# Patient Record
Sex: FEMALE | Race: OTHER | NOT HISPANIC OR LATINO
[De-identification: names, ages, dates, MRNs, and addresses within clinical notes are randomized per-mention and may not be internally consistent; named-entity substitution may affect disease eponyms.]

---

## 2020-06-30 ENCOUNTER — TRANSCRIPTION ENCOUNTER (OUTPATIENT)
Age: 40
End: 2020-06-30

## 2020-08-06 PROBLEM — Z00.00 ENCOUNTER FOR PREVENTIVE HEALTH EXAMINATION: Status: ACTIVE | Noted: 2020-08-06

## 2020-08-07 ENCOUNTER — APPOINTMENT (OUTPATIENT)
Dept: NEUROLOGY | Facility: CLINIC | Age: 40
End: 2020-08-07
Payer: COMMERCIAL

## 2020-08-07 PROCEDURE — 99203 OFFICE O/P NEW LOW 30 MIN: CPT | Mod: 95

## 2020-08-17 RX ORDER — ACETAMINOPHEN 325 MG/1
TABLET, FILM COATED ORAL
Refills: 0 | Status: ACTIVE | COMMUNITY

## 2020-08-17 RX ORDER — IBUPROFEN 200 MG
TABLET ORAL
Refills: 0 | Status: ACTIVE | COMMUNITY

## 2020-08-17 RX ORDER — TIZANIDINE 2 MG/1
2 TABLET ORAL
Refills: 0 | Status: ACTIVE | COMMUNITY

## 2020-08-17 NOTE — PLAN
[FreeTextEntry1] : 40 year old woman is being seen via telehealth visit for back pain after her accident two months ago. She fell down the stairs and has sharp shooting pain in her lower back to her right leg. She is not able to tolerate PT as she reports whole body shaking when the therapist lift her right leg. Her symptoms are suggestive of acute lumbar radiculopathy. I will start her on prednisone taper and will order lumbar spine MRI and EMG for further evaluation \par

## 2020-08-17 NOTE — HISTORY OF PRESENT ILLNESS
[Other Location: e.g. Home (Enter Location, City,State)___] : at [unfilled] [Home] : at home, [unfilled] , at the time of the visit. [Verbal consent obtained from patient] : the patient, [unfilled] [Time Spent: ___ minutes] : I have spent [unfilled] minutes with the patient on the telephone [FreeTextEntry1] : MISHA LOGAN is a 40 year old woman is being seen via telehealth visit for back pain. Remote direct face to face connection was obtained via [employer]. \par She fell down from 5 stairs last June. Since then she started to have intermittent low back pain in the  right side. After the accident she was seen in urgent care and was prescribed muscle relaxant with no significant effect. She reports bruises and swelling in the waste. She started to work with physical therapist but could not tolerate the cessions as she he reported each time therapist tried to lift her right leg she started to have shaking all over her body due to pain. She had shaking in in her trunk, right hand and left arm. She denies anxiety and depression. She currently takes Acetaminophen, ibuprofen and tizanidine with no significant effect.  She now has constant sharp shooting pain in her lower back with radiation and paresthesia to the right foot. She is only able to walk 50 feet and then her balance becomes unsteady.

## 2020-08-21 ENCOUNTER — APPOINTMENT (OUTPATIENT)
Dept: OBGYN | Facility: CLINIC | Age: 40
End: 2020-08-21
Payer: COMMERCIAL

## 2020-08-21 VITALS
HEIGHT: 66 IN | BODY MASS INDEX: 19.93 KG/M2 | WEIGHT: 124 LBS | DIASTOLIC BLOOD PRESSURE: 78 MMHG | HEART RATE: 68 BPM | SYSTOLIC BLOOD PRESSURE: 123 MMHG | TEMPERATURE: 97.8 F

## 2020-08-21 DIAGNOSIS — Z78.9 OTHER SPECIFIED HEALTH STATUS: ICD-10-CM

## 2020-08-21 DIAGNOSIS — Z86.32 PERSONAL HISTORY OF GESTATIONAL DIABETES: ICD-10-CM

## 2020-08-21 DIAGNOSIS — Z83.3 FAMILY HISTORY OF DIABETES MELLITUS: ICD-10-CM

## 2020-08-21 PROCEDURE — 99386 PREV VISIT NEW AGE 40-64: CPT

## 2020-08-25 PROBLEM — Z78.9 EXERCISES OCCASIONALLY: Status: ACTIVE | Noted: 2020-08-21

## 2020-08-25 PROBLEM — Z86.32 HISTORY OF GESTATIONAL DIABETES MELLITUS (GDM): Status: RESOLVED | Noted: 2020-08-25 | Resolved: 2020-08-25

## 2020-08-25 PROBLEM — Z78.9 CONSUMES ALCOHOL OCCASIONALLY: Status: ACTIVE | Noted: 2020-08-21

## 2020-08-25 PROBLEM — Z83.3 FAMILY HISTORY OF DIABETES MELLITUS: Status: ACTIVE | Noted: 2020-08-21

## 2020-08-25 LAB
C TRACH RRNA SPEC QL NAA+PROBE: NOT DETECTED
N GONORRHOEA RRNA SPEC QL NAA+PROBE: NOT DETECTED
SOURCE AMPLIFICATION: NORMAL

## 2020-08-25 NOTE — PHYSICAL EXAM
[Awake] : awake [Alert] : alert [Acute Distress] : no acute distress [LAD] : no lymphadenopathy [Mass] : no breast mass [Nipple Discharge] : no nipple discharge [Axillary LAD] : no axillary lymphadenopathy [Soft] : soft [Tender] : non tender [Distended] : not distended [Oriented x3] : oriented to person, place, and time [Depressed Mood] : not depressed [No Lesions] : no genitalia lesions [Normal] : external genitalia [Labia Majora] : labia major [Labia Minora] : labia minora [Pink Rugae] : pink rugae [No Bleeding] : there was no active vaginal bleeding [Discharge] : had no discharge [Pap Obtained] : a Pap smear was performed [Motion Tenderness] : there was no cervical motion tenderness [Tenderness] : nontender [Enlarged ___ wks] : not enlarged [Mass ___ cm] : no uterine mass was palpated [Uterine Adnexae] : were not tender and not enlarged

## 2020-08-25 NOTE — HISTORY OF PRESENT ILLNESS
[Definite:  ___ (Date)] : the last menstrual period was [unfilled] [Menarche Age: ____] : age at menarche was [unfilled] [Tubal Ligation] : has had a tubal ligation [Sexually Active] : is sexually active [Male ___] : [unfilled] male [No] : no [Regular Cycle Intervals] : periods have been regular [Monogamous] : is monogamous [Contraception] : does not use contraception

## 2020-08-25 NOTE — CHIEF COMPLAINT
[Initial Visit] : initial GYN visit [FreeTextEntry1] : Pt is here for initial annual exam. Overall pt feels good, denies any pelvic pain or abnormal bleeding. Pt states her menstrual cycle is regulated monthly with a normal flow, pt does notice her flow getting lighter. \par \par Mammo- 2020 WNL

## 2020-08-26 LAB
HPV HIGH+LOW RISK DNA PNL CVX: NOT DETECTED
SOURCE AMPLIFICATION: NORMAL
T VAGINALIS RRNA SPEC QL NAA+PROBE: NOT DETECTED

## 2020-10-27 ENCOUNTER — APPOINTMENT (OUTPATIENT)
Dept: NEUROLOGY | Facility: CLINIC | Age: 40
End: 2020-10-27
Payer: COMMERCIAL

## 2020-10-27 VITALS
HEART RATE: 98 BPM | BODY MASS INDEX: 19.93 KG/M2 | DIASTOLIC BLOOD PRESSURE: 73 MMHG | HEIGHT: 66 IN | WEIGHT: 124 LBS | SYSTOLIC BLOOD PRESSURE: 110 MMHG | TEMPERATURE: 97.8 F | OXYGEN SATURATION: 98 %

## 2020-10-27 DIAGNOSIS — M99.73 CONNECTIVE TISSUE AND DISC STENOSIS OF INTERVERTEBRAL FORAMINA OF LUMBAR REGION: ICD-10-CM

## 2020-10-27 DIAGNOSIS — M48.061 SPINAL STENOSIS, LUMBAR REGION WITHOUT NEUROGENIC CLAUDICATION: ICD-10-CM

## 2020-10-27 PROCEDURE — 99214 OFFICE O/P EST MOD 30 MIN: CPT

## 2020-10-27 PROCEDURE — 99072 ADDL SUPL MATRL&STAF TM PHE: CPT

## 2020-10-27 RX ORDER — DICLOFENAC POTASSIUM 50 MG/1
50 TABLET, COATED ORAL
Qty: 90 | Refills: 0 | Status: DISCONTINUED | COMMUNITY
Start: 2020-08-07 | End: 2020-10-27

## 2020-10-27 RX ORDER — TIZANIDINE 2 MG/1
2 TABLET ORAL DAILY
Qty: 30 | Refills: 3 | Status: ACTIVE | COMMUNITY
Start: 2020-10-27 | End: 1900-01-01

## 2020-10-27 RX ORDER — PREDNISONE 20 MG/1
20 TABLET ORAL
Qty: 16 | Refills: 0 | Status: DISCONTINUED | COMMUNITY
Start: 2020-08-07 | End: 2020-10-27

## 2020-10-27 NOTE — PHYSICAL EXAM
[FreeTextEntry1] : Mental status: Awake, alert and oriented x3.  Recent and remote memory intact.  Naming, repetition and comprehension intact.  Attention/concentration intact.  No dysarthria, no aphasia.  Fund of knowledge appropriate.  \par Cranial nerves: Pupils equally round and reactive to light, visual fields full, no nystagmus, extraocular muscles intact, V1 through V3 intact bilaterally and symmetric, face symmetric, hearing intact to finger rub, palate elevation symmetric, tongue was midline. \par Motor:  MRC grading 5/5 b/l UE/LE.   strength 5/5.  Normal tone and bulk.  No abnormal movements.  Focal tenderness on lumbar paraspinal muscles. Negative SLR. \par Sensation: Intact to light touch, proprioception, and pinprick. \par Coordination: No dysmetria on finger-to-nose and heel-to-shin.  No dysdiadokinesia.\par Reflexes: 2+ in bilateral UE/LE, downgoing toes bilaterally. (-) Ferreira.\par Gait: Narrow and steady. No ataxia.  Romberg negative\par \par

## 2020-10-27 NOTE — ASSESSMENT
[FreeTextEntry1] : MISHA LOGAN is a 40 year old woman is being seen as a follow up visit for low back pain with no significant sciatica for mild  lumbar spine stenosis and radiculopathy. She is now following with Dr Gaines and underwent epidural injections and nerve block with mild improvement. Currently reports low back stiffness and pain after long time sitting or walking. Recommended to do stretch exercises at home and resume light PT. I will obtain EMG/NCV of both legs. \par \par - Follow up with Dr Gaines for further procedures. \par - Resume PT \par - Home stretches \par - Continue tizanidine PRN\par - EMG of both legs .

## 2020-10-27 NOTE — HISTORY OF PRESENT ILLNESS
[FreeTextEntry1] : MISHA LOGAN is a 40 year old woman is being seen as a follow up patient for low back pain. She was seen on August 7th as a new patient via Telehealth visit after she fell down from 5 stairs last June and  started to have intermittent low back pain in the right side. She started to work with physical therapist but could not tolerate the cessions as she he reported each time therapist tried to lift her right leg she started to have shaking all over her body due to pain. She currently takes Acetaminophen, ibuprofen and tizanidine as needed. MRI lumbar spine showed multiple mild disc bulging at C3, C4 and C5 with bilateral foraminal stenosis. She was seen by Neurosurgeon Dr Gaines and underwent epidural injection, trigger point injection and SI joint injection. Her radiating pain is better but she still has stiffness in her lower back. At one point I prescribed her prednisone taper orally but she reports no relief. \par

## 2020-10-27 NOTE — REVIEW OF SYSTEMS
[Tingling] : tingling [Arthralgias] : no arthralgias [Joint Pain] : joint pain [Limb Pain] : limb pain [Negative] : Integumentary

## 2021-01-14 ENCOUNTER — APPOINTMENT (OUTPATIENT)
Dept: NEUROLOGY | Facility: CLINIC | Age: 41
End: 2021-01-14

## 2021-02-08 ENCOUNTER — LABORATORY RESULT (OUTPATIENT)
Age: 41
End: 2021-02-08

## 2021-02-08 ENCOUNTER — OUTPATIENT (OUTPATIENT)
Dept: OUTPATIENT SERVICES | Facility: HOSPITAL | Age: 41
LOS: 1 days | Discharge: HOME | End: 2021-02-08

## 2021-02-08 DIAGNOSIS — Z11.59 ENCOUNTER FOR SCREENING FOR OTHER VIRAL DISEASES: ICD-10-CM

## 2021-02-11 ENCOUNTER — APPOINTMENT (OUTPATIENT)
Dept: NEUROLOGY | Facility: CLINIC | Age: 41
End: 2021-02-11
Payer: COMMERCIAL

## 2021-02-11 PROCEDURE — 95886 MUSC TEST DONE W/N TEST COMP: CPT

## 2021-02-11 PROCEDURE — 99072 ADDL SUPL MATRL&STAF TM PHE: CPT

## 2021-02-11 PROCEDURE — 95911 NRV CNDJ TEST 9-10 STUDIES: CPT

## 2021-02-24 ENCOUNTER — APPOINTMENT (OUTPATIENT)
Dept: NEUROSURGERY | Facility: CLINIC | Age: 41
End: 2021-02-24
Payer: COMMERCIAL

## 2021-02-24 DIAGNOSIS — M79.10 MYALGIA, UNSPECIFIED SITE: ICD-10-CM

## 2021-02-24 DIAGNOSIS — M54.5 LOW BACK PAIN: ICD-10-CM

## 2021-02-24 PROCEDURE — 99072 ADDL SUPL MATRL&STAF TM PHE: CPT

## 2021-02-24 PROCEDURE — 99204 OFFICE O/P NEW MOD 45 MIN: CPT

## 2021-03-05 NOTE — HISTORY OF PRESENT ILLNESS
[de-identified] : MISHA LOGAN is a 40 year old woman is being seen for back pain.  She fell down from 5 stairs last June. Since then she started to have intermittent low back pain in the right side. After the accident she was seen in urgent care and was prescribed muscle relaxant with no significant effect. She had some radiation into the right foot with paraesthesias that have dissipated after injections and PT. she cont to have back pain. MRI of the lumbar spine is significant for minimal disc herniation at L5-S1. good alignment, no significant central or foraminal stenosis. EMG with no abnormalities.

## 2021-03-05 NOTE — PLAN
[FreeTextEntry1] : I do not believe Ms. Medrano would benefit from any structural spine surgery. She is relieved about that. We discussed extensively about the myriad causes of low back pain. I do believe she would benefit from extensive focused PT/daily stretching. She is not interested in any further injection therapies. she would like to go to [facility] to participate in an ayurvedic and yoga health retreat. I believe this will benefit her greatly. PT referral given. She would like to explore acupuncture, referral given. i will see her back as needed.

## 2021-11-15 ENCOUNTER — APPOINTMENT (OUTPATIENT)
Dept: OBGYN | Facility: CLINIC | Age: 41
End: 2021-11-15
Payer: COMMERCIAL

## 2021-11-15 VITALS
DIASTOLIC BLOOD PRESSURE: 74 MMHG | BODY MASS INDEX: 20.89 KG/M2 | TEMPERATURE: 97.7 F | HEART RATE: 82 BPM | HEIGHT: 66 IN | SYSTOLIC BLOOD PRESSURE: 115 MMHG | WEIGHT: 130 LBS

## 2021-11-15 DIAGNOSIS — Z87.39 PERSONAL HISTORY OF OTHER DISEASES OF THE MUSCULOSKELETAL SYSTEM AND CONNECTIVE TISSUE: ICD-10-CM

## 2021-11-15 PROCEDURE — 99396 PREV VISIT EST AGE 40-64: CPT

## 2021-11-15 PROCEDURE — XXXXX: CPT

## 2021-11-17 LAB
C TRACH RRNA SPEC QL NAA+PROBE: NOT DETECTED
HPV HIGH+LOW RISK DNA PNL CVX: NOT DETECTED
N GONORRHOEA RRNA SPEC QL NAA+PROBE: NOT DETECTED
SOURCE AMPLIFICATION: NORMAL
SOURCE AMPLIFICATION: NORMAL
T VAGINALIS RRNA SPEC QL NAA+PROBE: NOT DETECTED

## 2021-11-23 ENCOUNTER — NON-APPOINTMENT (OUTPATIENT)
Age: 41
End: 2021-11-23

## 2021-12-03 LAB — CYTOLOGY CVX/VAG DOC THIN PREP: NORMAL

## 2022-05-19 PROBLEM — Z87.39 HISTORY OF BACK PAIN: Status: RESOLVED | Noted: 2022-05-19 | Resolved: 2022-05-19

## 2022-05-19 NOTE — DISCUSSION/SUMMARY
[FreeTextEntry1] : A: 41-year-old for annual GYN exam, intermittent breast pain\par \par P: GYN exam done\par Pap smear done\par Safe sex practices discussed\par Pain and bleeding precautions\par Referral for diagnostic mammo and breast ultrasound\par Encourage healthy diet and exercise\par Follow-up for routine exam or as needed

## 2022-05-19 NOTE — PHYSICAL EXAM
[Appropriately responsive] : appropriately responsive [Alert] : alert [No Acute Distress] : no acute distress [No Lymphadenopathy] : no lymphadenopathy [Regular Rate Rhythm] : regular rate rhythm [Soft] : soft [Non-tender] : non-tender [Non-distended] : non-distended [No Mass] : no mass [Oriented x3] : oriented x3 [FreeTextEntry6] : Some mild generalized tenderness bilaterally however no mass or pinpoint pain noted, no discharge or skin changes [Examination Of The Breasts] : a normal appearance [No Discharge] : no discharge [No Masses] : no breast masses were palpable [No Lesions] : no lesions  [Labia Majora] : normal [Labia Minora] : normal [Normal] : normal [No Bleeding] : There was no active vaginal bleeding [Tenderness] : nontender [Enlarged ___ wks] : not enlarged [Mass ___ cm] : no uterine mass was palpated [Uterine Adnexae] : normal [FreeTextEntry5] : Pap smear done

## 2022-05-19 NOTE — HISTORY OF PRESENT ILLNESS
[Patient reported mammogram was normal] : Patient reported mammogram was normal [Patient reported PAP Smear was normal] : Patient reported PAP Smear was normal [Patient reported colonoscopy was normal] : Patient reported colonoscopy was normal [N] : Patient reports normal menses [Normal Amount/Duration] :  normal amount and duration [Regular Cycle Intervals] : periods have been regular [Frequency: Q ___ days] : menstrual periods occur approximately every [unfilled] days [Menarche Age: ____] : age at menarche was [unfilled] [Currently Active] : currently active [Men] : men [No] : No [Gonorrhea test offered] : Gonorrhea test offered [Chlamydia test offered] : Chlamydia test offered [Trichomonas test offered] : Trichomonas test offered [HPV test offered] : HPV test offered [Y] : Patient uses contraception [Tubal Occlusion] : has had a tubal occlusion [TextBox_4] : 41-year-old para 0-2-1-2 with LMP 11/1/2021 came for annual GYN exam and Pap smear.  Patient denies abnormal uterine bleeding, pelvic pain, discharge, dysuria or other GYN complaints.  She does complain of some intermittent breast pain, she denies abnormal masses or discharge or trauma.  She denies family history of breast cancer.  We will send for diagnostic imaging.  She denies history of abnormal Pap, HPV, STDs, fibroids or cysts.  She is sexually active with 1 male partner- and had bilateral tubal ligation as form of contraception. [Mammogramdate] : 2020 [TextBox_19] : Will give referral [PapSmeardate] : 8/2020 [ColonoscopyDate] : 2006 [TextBox_43] : Recommended screening at 45 years of age [LMPDate] : 11/1/21 [MensesFreq] : 24 [MensesLength] : 5 [MensesAmount] : Nl flow  [PGHxTotal] : 3 [PGHxFullTerm] : 0 [PGxPremature] : 2 [PGHxAbortions] : 1 [[place]xLiving] : 2 [PGHxABInduced] : 0 [PGHxABSpont] : 0 [PGxEctopic] : 1 [PGHxMultBirths] : 0 [FreeTextEntry1] : 11/1/21 [FreeTextEntry3] : Tubal ligation

## 2022-11-17 ENCOUNTER — APPOINTMENT (OUTPATIENT)
Dept: PAIN MANAGEMENT | Facility: CLINIC | Age: 42
End: 2022-11-17

## 2022-12-08 ENCOUNTER — APPOINTMENT (OUTPATIENT)
Dept: NEUROLOGY | Facility: CLINIC | Age: 42
End: 2022-12-08

## 2023-01-12 ENCOUNTER — APPOINTMENT (OUTPATIENT)
Dept: NEUROLOGY | Facility: CLINIC | Age: 43
End: 2023-01-12
Payer: COMMERCIAL

## 2023-01-12 VITALS
HEART RATE: 77 BPM | WEIGHT: 132 LBS | DIASTOLIC BLOOD PRESSURE: 91 MMHG | SYSTOLIC BLOOD PRESSURE: 124 MMHG | HEIGHT: 66 IN | BODY MASS INDEX: 21.21 KG/M2

## 2023-01-12 DIAGNOSIS — M54.16 RADICULOPATHY, LUMBAR REGION: ICD-10-CM

## 2023-01-12 PROCEDURE — 99204 OFFICE O/P NEW MOD 45 MIN: CPT

## 2023-01-12 RX ORDER — TIZANIDINE 4 MG/1
4 TABLET ORAL
Qty: 60 | Refills: 1 | Status: ACTIVE | COMMUNITY
Start: 2023-01-12 | End: 1900-01-01

## 2023-01-12 RX ORDER — GABAPENTIN 300 MG/1
300 CAPSULE ORAL 3 TIMES DAILY
Qty: 90 | Refills: 2 | Status: ACTIVE | COMMUNITY
Start: 2023-01-12 | End: 1900-01-01

## 2023-01-17 NOTE — HISTORY OF PRESENT ILLNESS
[FreeTextEntry1] : Ms. Koenig is a 42 year old woman who comes in today for neck and back pain. It started October 2022after chiropractics manipulation of her neck. Treatment for the back did not cause any complications.  With month of physical therapy afterwards, she has not seen any significant improvement.  She is not able to drive. Tension does radiate from he neck to her shoulders.   She denies any significant weakness, numbness, urinary/ bowel incontinence

## 2023-01-17 NOTE — ASSESSMENT
[FreeTextEntry1] : Cervical radiculopathy\par \par -MRI of the cervical spine without mario alberto, EMG/NCS of upper extremities\par - will also send her for ultrasound of the carotids make sure no dissection since her symptoms started after chiropractic manipulation of the neck\par -a trial of gabapentin  and tizanidine, and I warned her of the potential side effects including drowsiness and mood changes, and patient reported understanding\par -follow up in 1 month.  if not better, will refer to pain management.\par \par \par \par Alexandrea SOTO attest that this document has been prepared under the direction and in the presence of Provider Dipti Duval \par Thank You for letting me assist in the management of this patient.\par \par Lopez Arora DO \pete Board Certified, Neurology

## 2023-01-17 NOTE — PHYSICAL EXAM
[Person] : oriented to person [Place] : oriented to place [Time] : oriented to time [Concentration Intact] : normal concentrating ability [Visual Intact] : visual attention was ~T not ~L decreased [Naming Objects] : no difficulty naming common objects [Repeating Phrases] : no difficulty repeating a phrase [Writing A Sentence] : no difficulty writing a sentence [Fluency] : fluency intact [Comprehension] : comprehension intact [Reading] : reading intact [Past History] : adequate knowledge of personal past history [Cranial Nerves Optic (II)] : visual acuity intact bilaterally,  visual fields full to confrontation, pupils equal round and reactive to light [Cranial Nerves Oculomotor (III)] : extraocular motion intact [Cranial Nerves Trigeminal (V)] : facial sensation intact symmetrically [Cranial Nerves Facial (VII)] : face symmetrical [Cranial Nerves Vestibulocochlear (VIII)] : hearing was intact bilaterally [Cranial Nerves Glossopharyngeal (IX)] : tongue and palate midline [Cranial Nerves Accessory (XI - Cranial And Spinal)] : head turning and shoulder shrug symmetric [Cranial Nerves Hypoglossal (XII)] : there was no tongue deviation with protrusion [Motor Tone] : muscle tone was normal in all four extremities [Motor Strength] : muscle strength was normal in all four extremities [No Muscle Atrophy] : normal bulk in all four extremities [Sensation Tactile Decrease] : light touch was intact [Abnormal Walk] : normal gait [Balance] : balance was intact [Past-pointing] : there was no past-pointing [Tremor] : no tremor present [2+] : Ankle jerk left 2+ [Plantar Reflex Right Only] : normal on the right [Plantar Reflex Left Only] : normal on the left [FreeTextEntry6] :  limited range of motion in the neck horizontally, spasms in the bilateral trapezii as well as cervical paraspinals with tender points

## 2023-02-13 ENCOUNTER — APPOINTMENT (OUTPATIENT)
Dept: NEUROLOGY | Facility: CLINIC | Age: 43
End: 2023-02-13
Payer: COMMERCIAL

## 2023-02-13 PROCEDURE — 95886 MUSC TEST DONE W/N TEST COMP: CPT

## 2023-02-13 PROCEDURE — 95911 NRV CNDJ TEST 9-10 STUDIES: CPT

## 2023-02-22 ENCOUNTER — APPOINTMENT (OUTPATIENT)
Dept: NEUROLOGY | Facility: CLINIC | Age: 43
End: 2023-02-22
Payer: COMMERCIAL

## 2023-02-22 VITALS
DIASTOLIC BLOOD PRESSURE: 85 MMHG | HEART RATE: 113 BPM | HEIGHT: 66 IN | WEIGHT: 132 LBS | SYSTOLIC BLOOD PRESSURE: 113 MMHG | BODY MASS INDEX: 21.21 KG/M2

## 2023-02-22 DIAGNOSIS — M54.12 RADICULOPATHY, CERVICAL REGION: ICD-10-CM

## 2023-02-22 PROCEDURE — 99213 OFFICE O/P EST LOW 20 MIN: CPT

## 2023-02-22 RX ORDER — PREGABALIN 75 MG/1
75 CAPSULE ORAL 3 TIMES DAILY
Qty: 90 | Refills: 3 | Status: ACTIVE | COMMUNITY
Start: 2023-02-22 | End: 1900-01-01

## 2023-02-22 NOTE — ASSESSMENT
[FreeTextEntry1] :  cervical radiculopathy\par - Physical Therapy. \par - A Trial of Pregabalin. 1 Tablet TID. \par \par \par I, Alexandrea Whipple, Attest that this documentation has been prepared under the direction and in the presence of Provider Lopez Arora DO\par \par Thank You for letting me assist in the management of this patient. \par \par Lopez Arora DO\par Board Certified, Neurology\par

## 2023-02-22 NOTE — HISTORY OF PRESENT ILLNESS
[FreeTextEntry1] : Ms. Koenig is a 42 year old woman who returns for a follow up in regards to her EMG results. She has a sharp shooting pain in the back of her head since she left the office last visit. Her pain is on her neck and radiates to her left ear and cheek. MRI imaging showed minor bulging discs. Carotid Ultrasound was normal.  she was given gabapentin did not receive it.  She saw a neurologist in [place] and was given a Lyrica equivalent which she did find helpful and would like to continue\par \par

## 2023-02-22 NOTE — PHYSICAL EXAM

## 2023-02-22 NOTE — REASON FOR VISIT
[Follow-Up: _____] : a [unfilled] follow-up visit [Consultation] : a consultation visit [FreeTextEntry1] : Neck Pain

## 2023-04-26 ENCOUNTER — APPOINTMENT (OUTPATIENT)
Dept: NEUROLOGY | Facility: CLINIC | Age: 43
End: 2023-04-26

## 2023-10-01 PROBLEM — M54.16 LUMBAR RADICULAR PAIN: Status: ACTIVE | Noted: 2020-08-07

## 2023-11-14 ENCOUNTER — APPOINTMENT (OUTPATIENT)
Dept: OBGYN | Facility: CLINIC | Age: 43
End: 2023-11-14
Payer: COMMERCIAL

## 2023-11-14 VITALS
TEMPERATURE: 98.6 F | WEIGHT: 133 LBS | HEART RATE: 86 BPM | DIASTOLIC BLOOD PRESSURE: 80 MMHG | HEIGHT: 66 IN | BODY MASS INDEX: 21.38 KG/M2 | SYSTOLIC BLOOD PRESSURE: 109 MMHG

## 2023-11-14 DIAGNOSIS — Z87.42 PERSONAL HISTORY OF OTHER DISEASES OF THE FEMALE GENITAL TRACT: ICD-10-CM

## 2023-11-14 DIAGNOSIS — Z12.4 ENCOUNTER FOR SCREENING FOR MALIGNANT NEOPLASM OF CERVIX: ICD-10-CM

## 2023-11-14 DIAGNOSIS — Z01.419 ENCOUNTER FOR GYNECOLOGICAL EXAMINATION (GENERAL) (ROUTINE) W/OUT ABNORMAL FINDINGS: ICD-10-CM

## 2023-11-14 PROCEDURE — 99396 PREV VISIT EST AGE 40-64: CPT

## 2023-11-24 ENCOUNTER — NON-APPOINTMENT (OUTPATIENT)
Age: 43
End: 2023-11-24

## 2023-11-25 LAB — CYTOLOGY CVX/VAG DOC THIN PREP: NORMAL

## 2023-12-01 ENCOUNTER — APPOINTMENT (OUTPATIENT)
Dept: OBGYN | Facility: CLINIC | Age: 43
End: 2023-12-01
Payer: COMMERCIAL

## 2023-12-01 VITALS
TEMPERATURE: 98.6 F | WEIGHT: 135 LBS | SYSTOLIC BLOOD PRESSURE: 103 MMHG | DIASTOLIC BLOOD PRESSURE: 79 MMHG | HEIGHT: 66 IN | HEART RATE: 70 BPM | BODY MASS INDEX: 21.69 KG/M2

## 2023-12-01 DIAGNOSIS — D21.9 BENIGN NEOPLASM OF CONNECTIVE AND OTHER SOFT TISSUE, UNSPECIFIED: ICD-10-CM

## 2023-12-01 DIAGNOSIS — R92.2 INCONCLUSIVE MAMMOGRAM: ICD-10-CM

## 2023-12-01 DIAGNOSIS — Z71.2 PERSON CONSULTING FOR EXPLANATION OF EXAMINATION OR TEST FINDINGS: ICD-10-CM

## 2023-12-01 DIAGNOSIS — N64.89 OTHER SPECIFIED DISORDERS OF BREAST: ICD-10-CM

## 2023-12-01 DIAGNOSIS — Z71.89 OTHER SPECIFIED COUNSELING: ICD-10-CM

## 2023-12-01 DIAGNOSIS — R92.30 INCONCLUSIVE MAMMOGRAM: ICD-10-CM

## 2023-12-01 DIAGNOSIS — N80.03 ADENOMYOSIS OF THE UTERUS: ICD-10-CM

## 2023-12-01 PROCEDURE — 99213 OFFICE O/P EST LOW 20 MIN: CPT

## 2023-12-07 PROBLEM — Z12.4 ENCOUNTER FOR SCREENING FOR CERVICAL CANCER: Status: ACTIVE | Noted: 2020-08-21

## 2023-12-07 PROBLEM — Z87.42 HISTORY OF BREAST PAIN: Status: RESOLVED | Noted: 2022-05-19 | Resolved: 2023-12-07

## 2023-12-23 PROBLEM — N80.03 ADENOMYOSIS: Status: ACTIVE | Noted: 2023-12-23

## 2023-12-23 PROBLEM — Z71.2 ENCOUNTER TO DISCUSS TEST RESULTS: Status: ACTIVE | Noted: 2023-12-23

## 2023-12-23 PROBLEM — D21.9 FIBROIDS: Status: ACTIVE | Noted: 2023-11-14

## 2023-12-23 PROBLEM — N64.89 BREAST ASYMMETRY: Status: ACTIVE | Noted: 2023-11-30

## 2023-12-23 PROBLEM — R92.2 DENSE BREASTS: Status: ACTIVE | Noted: 2023-11-14

## 2023-12-23 PROBLEM — Z71.89 OTHER SPECIFIED COUNSELING: Status: ACTIVE | Noted: 2020-08-25

## 2023-12-23 NOTE — DISCUSSION/SUMMARY
[FreeTextEntry1] : A:44yo with fibroids, adenomyosis, breast asymmetry, dense breasts  P: results reviewed and discussed     safe sex practices      pain and bleeding precautions      referral for further breast imaging given      will give referral to breast surgeon PRN       f/u for routine exam or PRN

## 2023-12-23 NOTE — COUNSELING
[Vitamins/Supplements] : vitamins/supplements [Nutrition/ Exercise/ Weight Management] : nutrition, exercise, weight management [Contraception/ Emergency Contraception/ Safe Sexual Practices] : contraception, emergency contraception, safe sexual practices [STD (testing, results, tx)] : STD (testing, results, tx) [Lab Results] : lab results [Medication Management] : medication management

## 2023-12-23 NOTE — HISTORY OF PRESENT ILLNESS
[Patient reported PAP Smear was normal] : Patient reported PAP Smear was normal [TextBox_4] : 42yo  with LMP 11/20/2023 came for consultation to discuss recent test results.  She went for recent poncho/sono for screening and was birads 0 needing further imaging and referral given.  She has upcoming appt.  She also had recent pelvic sonogram done just for evaluation of known fibroids.  She was concerned about findings and wanted to discuss.  Patient cousneled that sonogram showed uterus 9.8x8.0x8.8cm with anterior fundal fibroid measuring [id number].  The myometrium is diffusely heterogeneous and the EMS is 8mm.  Both ovaries appear WNL and trace free fluid noted. Patient cousneled on possibility of adenomyosis and fibroid mgmt.  She understood all counseling and all questions answered satisfactorily.  She states she feels well and has no complaints and does not want any intervention.  Strict pain and bleeding precautions and will monitor PRN.  Patient encourage to follow up for routine exam or PRN. [TextBox_19] : birads 0 [Mammogramdate] : 11/2023 [BreastSonogramDate] : 11/2023 [PapSmeardate] : 11/2023 [FreeTextEntry1] : 11/20/23 [FreeTextEntry3] : tubes tied

## 2024-01-03 ENCOUNTER — NON-APPOINTMENT (OUTPATIENT)
Age: 44
End: 2024-01-03

## 2024-02-16 ENCOUNTER — APPOINTMENT (OUTPATIENT)
Dept: NEUROLOGY | Facility: CLINIC | Age: 44
End: 2024-02-16

## 2025-02-21 ENCOUNTER — APPOINTMENT (OUTPATIENT)
Dept: OBGYN | Facility: CLINIC | Age: 45
End: 2025-02-21

## 2025-02-21 ENCOUNTER — NON-APPOINTMENT (OUTPATIENT)
Age: 45
End: 2025-02-21

## 2025-02-21 VITALS
SYSTOLIC BLOOD PRESSURE: 118 MMHG | TEMPERATURE: 98.6 F | BODY MASS INDEX: 21.69 KG/M2 | HEIGHT: 66 IN | HEART RATE: 74 BPM | DIASTOLIC BLOOD PRESSURE: 80 MMHG | WEIGHT: 135 LBS

## 2025-02-21 DIAGNOSIS — N80.03 ADENOMYOSIS OF THE UTERUS: ICD-10-CM

## 2025-02-21 DIAGNOSIS — D21.9 BENIGN NEOPLASM OF CONNECTIVE AND OTHER SOFT TISSUE, UNSPECIFIED: ICD-10-CM

## 2025-02-21 DIAGNOSIS — Z86.018 PERSONAL HISTORY OF OTHER BENIGN NEOPLASM: ICD-10-CM

## 2025-02-21 DIAGNOSIS — Z87.42 PERSONAL HISTORY OF OTHER DISEASES OF THE FEMALE GENITAL TRACT: ICD-10-CM

## 2025-02-21 DIAGNOSIS — Z12.4 ENCOUNTER FOR SCREENING FOR MALIGNANT NEOPLASM OF CERVIX: ICD-10-CM

## 2025-02-21 DIAGNOSIS — Z71.89 OTHER SPECIFIED COUNSELING: ICD-10-CM

## 2025-02-21 DIAGNOSIS — Z01.419 ENCOUNTER FOR GYNECOLOGICAL EXAMINATION (GENERAL) (ROUTINE) W/OUT ABNORMAL FINDINGS: ICD-10-CM

## 2025-02-21 DIAGNOSIS — R92.30 DENSE BREASTS, UNSPECIFIED: ICD-10-CM

## 2025-02-21 PROCEDURE — 99396 PREV VISIT EST AGE 40-64: CPT

## 2025-02-23 PROBLEM — R92.30 DENSE BREASTS: Status: ACTIVE | Noted: 2023-11-14

## 2025-02-23 PROBLEM — Z87.42 HISTORY OF OVARIAN CYST: Status: RESOLVED | Noted: 2025-02-21 | Resolved: 2025-02-23

## 2025-02-23 PROBLEM — Z86.018 HISTORY OF UTERINE LEIOMYOMA: Status: RESOLVED | Noted: 2025-02-21 | Resolved: 2025-02-23

## 2025-02-26 LAB — CYTOLOGY CVX/VAG DOC THIN PREP: NORMAL

## 2025-03-21 ENCOUNTER — NON-APPOINTMENT (OUTPATIENT)
Age: 45
End: 2025-03-21